# Patient Record
Sex: MALE | Race: ASIAN | NOT HISPANIC OR LATINO | URBAN - METROPOLITAN AREA
[De-identification: names, ages, dates, MRNs, and addresses within clinical notes are randomized per-mention and may not be internally consistent; named-entity substitution may affect disease eponyms.]

---

## 2017-11-24 ENCOUNTER — EMERGENCY (EMERGENCY)
Facility: HOSPITAL | Age: 57
LOS: 1 days | Discharge: HOME | End: 2017-11-24
Attending: EMERGENCY MEDICINE | Admitting: EMERGENCY MEDICINE
Payer: COMMERCIAL

## 2017-11-24 VITALS
TEMPERATURE: 98 F | SYSTOLIC BLOOD PRESSURE: 164 MMHG | OXYGEN SATURATION: 97 % | DIASTOLIC BLOOD PRESSURE: 86 MMHG | HEART RATE: 84 BPM | RESPIRATION RATE: 18 BRPM

## 2017-11-24 VITALS
RESPIRATION RATE: 18 BRPM | SYSTOLIC BLOOD PRESSURE: 154 MMHG | DIASTOLIC BLOOD PRESSURE: 90 MMHG | HEART RATE: 77 BPM | OXYGEN SATURATION: 99 % | TEMPERATURE: 98 F

## 2017-11-24 LAB
ALBUMIN SERPL ELPH-MCNC: 4.2 G/DL — SIGNIFICANT CHANGE UP (ref 3.3–5)
ALP SERPL-CCNC: 94 U/L — SIGNIFICANT CHANGE UP (ref 40–120)
ALT FLD-CCNC: 18 U/L RC — SIGNIFICANT CHANGE UP (ref 10–45)
ANION GAP SERPL CALC-SCNC: 11 MMOL/L — SIGNIFICANT CHANGE UP (ref 5–17)
APTT BLD: 27.3 SEC — LOW (ref 27.5–37.4)
AST SERPL-CCNC: 24 U/L — SIGNIFICANT CHANGE UP (ref 10–40)
BILIRUB SERPL-MCNC: 0.2 MG/DL — SIGNIFICANT CHANGE UP (ref 0.2–1.2)
BUN SERPL-MCNC: 18 MG/DL — SIGNIFICANT CHANGE UP (ref 7–23)
CALCIUM SERPL-MCNC: 10.2 MG/DL — SIGNIFICANT CHANGE UP (ref 8.4–10.5)
CHLORIDE SERPL-SCNC: 103 MMOL/L — SIGNIFICANT CHANGE UP (ref 96–108)
CK SERPL-CCNC: 150 U/L — SIGNIFICANT CHANGE UP (ref 30–200)
CO2 SERPL-SCNC: 25 MMOL/L — SIGNIFICANT CHANGE UP (ref 22–31)
CREAT SERPL-MCNC: 1.46 MG/DL — HIGH (ref 0.5–1.3)
GLUCOSE SERPL-MCNC: 102 MG/DL — HIGH (ref 70–99)
HCT VFR BLD CALC: 47 % — SIGNIFICANT CHANGE UP (ref 39–50)
HGB BLD-MCNC: 15.8 G/DL — SIGNIFICANT CHANGE UP (ref 13–17)
INR BLD: 0.95 RATIO — SIGNIFICANT CHANGE UP (ref 0.88–1.16)
MCHC RBC-ENTMCNC: 30.4 PG — SIGNIFICANT CHANGE UP (ref 27–34)
MCHC RBC-ENTMCNC: 33.7 GM/DL — SIGNIFICANT CHANGE UP (ref 32–36)
MCV RBC AUTO: 90.4 FL — SIGNIFICANT CHANGE UP (ref 80–100)
PLATELET # BLD AUTO: 235 K/UL — SIGNIFICANT CHANGE UP (ref 150–400)
POTASSIUM SERPL-MCNC: 4.5 MMOL/L — SIGNIFICANT CHANGE UP (ref 3.5–5.3)
POTASSIUM SERPL-SCNC: 4.5 MMOL/L — SIGNIFICANT CHANGE UP (ref 3.5–5.3)
PROT SERPL-MCNC: 7.4 G/DL — SIGNIFICANT CHANGE UP (ref 6–8.3)
PROTHROM AB SERPL-ACNC: 10.3 SEC — SIGNIFICANT CHANGE UP (ref 9.8–12.7)
RBC # BLD: 5.2 M/UL — SIGNIFICANT CHANGE UP (ref 4.2–5.8)
RBC # FLD: 11.8 % — SIGNIFICANT CHANGE UP (ref 10.3–14.5)
SODIUM SERPL-SCNC: 139 MMOL/L — SIGNIFICANT CHANGE UP (ref 135–145)
WBC # BLD: 12.6 K/UL — HIGH (ref 3.8–10.5)
WBC # FLD AUTO: 12.6 K/UL — HIGH (ref 3.8–10.5)

## 2017-11-24 PROCEDURE — 82553 CREATINE MB FRACTION: CPT

## 2017-11-24 PROCEDURE — 99283 EMERGENCY DEPT VISIT LOW MDM: CPT | Mod: 25

## 2017-11-24 PROCEDURE — 85610 PROTHROMBIN TIME: CPT

## 2017-11-24 PROCEDURE — 82550 ASSAY OF CK (CPK): CPT

## 2017-11-24 PROCEDURE — 99285 EMERGENCY DEPT VISIT HI MDM: CPT | Mod: 25

## 2017-11-24 PROCEDURE — 84484 ASSAY OF TROPONIN QUANT: CPT

## 2017-11-24 PROCEDURE — 80053 COMPREHEN METABOLIC PANEL: CPT

## 2017-11-24 PROCEDURE — 85730 THROMBOPLASTIN TIME PARTIAL: CPT

## 2017-11-24 PROCEDURE — 85027 COMPLETE CBC AUTOMATED: CPT

## 2017-11-24 PROCEDURE — 93010 ELECTROCARDIOGRAM REPORT: CPT

## 2017-11-24 PROCEDURE — 93005 ELECTROCARDIOGRAM TRACING: CPT

## 2017-11-24 RX ORDER — SODIUM CHLORIDE 9 MG/ML
3 INJECTION INTRAMUSCULAR; INTRAVENOUS; SUBCUTANEOUS EVERY 8 HOURS
Qty: 0 | Refills: 0 | Status: DISCONTINUED | OUTPATIENT
Start: 2017-11-24 | End: 2017-11-28

## 2017-11-24 RX ADMIN — SODIUM CHLORIDE 3 MILLILITER(S): 9 INJECTION INTRAMUSCULAR; INTRAVENOUS; SUBCUTANEOUS at 23:20

## 2017-11-24 RX ADMIN — SODIUM CHLORIDE 3 MILLILITER(S): 9 INJECTION INTRAMUSCULAR; INTRAVENOUS; SUBCUTANEOUS at 23:11

## 2017-11-24 NOTE — ED PROVIDER NOTE - EYES, MLM
Clear bilaterally, pupils equal, round and reactive to light. **ATTENDING ADDENDUM (Dr. Devon Cline): Extraocular muscle movements intact. Clear corneas bilaterally, pupils equal and round. NO nystagmus.

## 2017-11-24 NOTE — ED PROVIDER NOTE - MUSCULOSKELETAL, MLM
Spine appears normal, range of motion is not limited, no muscle or joint tenderness. **ATTENDING ADDENDUM (Dr. Devon Cline): NO cords, soft-tissue swelling, or calf tenderness.

## 2017-11-24 NOTE — ED ADULT NURSE NOTE - OBJECTIVE STATEMENT
57 year old male presented to ED with c/o of dizziness starting today. Pt came from PMD for HTN and abnormal EKG. EKG shows NSR with septal defects. BP currently 164/86. Pt denies CP, SOB, nausea/vomiting, numbness/tingling, fever, cough, chills, headache, blurred vision, slurred speech. Pt a&ox3, lung sounds clear, heart rate regular, abdomen soft nontender nondistended to palp. Skin intact. IV in left AC 20G and patent. Pt currently resting in bed with family at bedside. Will continue to monitor and assess while offering support and reassurance.

## 2017-11-24 NOTE — ED PROVIDER NOTE - CRANIAL NERVE AND PUPILLARY EXAM
cranial nerves 2-12 intact/central and peripheral vision intact/gag reflex intact/tongue is midline/extra-ocular movements intact

## 2017-11-24 NOTE — ED PROVIDER NOTE - GASTROINTESTINAL, MLM
Abdomen soft, non-tender **ATTENDING ADDENDUM (Dr. Devon Cline): NO guarding, rebound, or rigidity. NO pulsatile or non-pulsatile masses. NO hernias. NO obvious hepatosplenomegaly.

## 2017-11-24 NOTE — ED PROVIDER NOTE - CARE PLAN
Principal Discharge DX:	Dizziness  Goal:	ATTENDING ADDENDUM (Dr. Devon Cline): Goals of care include resolution of emergent/urgent symptoms and concerns, and restoration to baseline level of homeostasis.  Instructions for follow-up, activity and diet:	ATTENDING ADDENDUM (Dr. Devon Cline): (1) anticipatory guidance provided  (2) rest  (3) outpatient follow-up with your primary care physician/provider (4) return if symptoms worsen, persist, or do not resolve (5) medications, if indicated, as prescribed (6) call you doctor for referral to cardiologist.  Secondary Diagnosis:	HLD (hyperlipidemia)  Secondary Diagnosis:	HTN (hypertension)

## 2017-11-24 NOTE — ED PROVIDER NOTE - NS_ ATTENDINGSCRIBEDETAILS _ED_A_ED_FT
**ATTENDING ADDENDUM (Dr. Devon Cline): I attest that I have directly examined this patient and reviewed and formulated the diagnostic and therapeutic management as described in EMR, including diagnostics, therapeutics and consultation as clinically warranted as noted and documented by my scribe. Please see MDM note and remainder of EMR for findings from CC, HPI, ROS, and PE. (Qu)

## 2017-11-24 NOTE — ED PROVIDER NOTE - PLAN OF CARE
ATTENDING ADDENDUM (Dr. Devon Cline): Goals of care include resolution of emergent/urgent symptoms and concerns, and restoration to baseline level of homeostasis. ATTENDING ADDENDUM (Dr. Devon Cline): (1) anticipatory guidance provided  (2) rest  (3) outpatient follow-up with your primary care physician/provider (4) return if symptoms worsen, persist, or do not resolve (5) medications, if indicated, as prescribed (6) call you doctor for referral to cardiologist.

## 2017-11-24 NOTE — ED PROVIDER NOTE - RESPIRATORY, MLM
Breath sounds clear and equal bilaterally. **ATTENDING ADDENDUM (Dr. Devon Cline): NO wheezing, rales, rhonchi, crackles, stridor, drooling, retractions, nasal flaring, or tripoding.

## 2017-11-24 NOTE — ED ADULT NURSE NOTE - CHPI ED SYMPTOMS NEG
no chills/no nausea/no numbness/no decreased eating/drinking/no pain/no tingling/no weakness/no fever/no vomiting

## 2017-11-24 NOTE — ED PROVIDER NOTE - CONDUCTED A DETAILED DISCUSSION WITH PATIENT AND/OR GUARDIAN REGARDING, MDM
lab results/return to ED if symptoms worsen, persist or questions arise/**ATTENDING ADDENDUM (Dr. Devon Cline): Anticipatory guidance provided./need for outpatient follow-up

## 2017-11-24 NOTE — ED PROVIDER NOTE - PHYSICAL EXAMINATION
**ATTENDING ADDENDUM (Dr. Devon Cline): I have reviewed and substantially contributed to the elements of the PE as documented above. I have directly performed an examination of this patient in conjunction with the other members (EM resident/PA/NP) of the patient care team.

## 2017-11-24 NOTE — ED PROVIDER NOTE - PROGRESS NOTE DETAILS
**ATTENDING ADDENDUM (Dr. Devon Cline): patient serially evaluated throughout ED course. NO acute deterioration up to this time in the ED. Initial set of ED diagnostics reviewed and noted with family. Anticipatory guidance provided. Agree with goals/plan of ED care as noted in the EMR. Will continue to observe and monitor closely until second set of cardiac markers drawn (approximately 03:00). **ATTENDING ADDENDUM (Dr. Devon Cline): patient serially evaluated throughout ED course. NO acute deterioration up to this time in the ED. Patient's son reports that patient ... **ATTENDING ADDENDUM (Dr. Devon Cline): patient serially evaluated throughout ED course. NO acute deterioration up to this time in the ED. Patient's son reports that patient does not want to stay in the ED for serial troponins and ECGs. Anticipatory guidance provided.. Risks, benefits and alternatives to the initially-proposed goals/plan of ED care as described in EMR, reviewed with patient and with family. Verbalized understanding of these. Patient prefers close outpatient followup with primary care physician/provider in lieu of delta troponins. Anticipatory guidance provided.

## 2017-11-24 NOTE — ED PROVIDER NOTE - ENMT, MLM
Airway patent, Nasal mucosa clear. Mouth with normal mucosa. Throat has no vesicles, no oropharyngeal exudates and uvula is midline. **ATTENDING ADDENDUM (Dr. Devon Cline): NO droop.

## 2017-11-24 NOTE — ED PROVIDER NOTE - PRIOR EKG STATUS
there is no prior EKG available for comparison/**ATTENDING ADDENDUM (Dr. Devon Cline): ECG from urgent care center

## 2017-11-24 NOTE — ED PROVIDER NOTE - OBJECTIVE STATEMENT
**ATTENDING OBJECTIVE STATEMENT (Dr. Devon Cline): I have reviewed this note, the presenting symptoms, and the Chief Complaint and the History of Present Illness as documented, with the other care provider(s) and nurses on the patient care team. I have also reviewed this patient's past medical/surgical history and social/family history as reviewed and listed in this electronic medical record. Patient is a 57-year-old man with the with PMHx of HTN, HLD, c/o dizziness and nausea. Described as everything was spinning. Currently pt doesn't endorse dizziness. Also notes lower back pain. Pt went to urgent care and noted EKG changes. Was then referred to the ED. Pt had a stress test 4 years ago and a CT scan 4 months ago with normal results by his cardiologist. Also notes chronic indigestion and noted a few days ago. Denies recent travel or any other complaints. **ATTENDING OBJECTIVE STATEMENT (Dr. Devon Cline): I have reviewed this note, the presenting symptoms, and the Chief Complaint and the History of Present Illness as documented, with the other care provider(s) and nurses on the patient care team. I have also reviewed this patient's past medical/surgical history and social/family history as reviewed and listed in this electronic medical record. Patient is a 57-year-old man with a history of Hypertension and hyperlipidemia now presenting with dizziness and nausea. Described everything as "spinning" earlier today, movement-associated, and reproducible with head rotation. Currently resolved. Also notes lower back pain. Went to urgent care center where providers there noted "ECG changes" ("septal infarct"). Referred to ED for advanced diagnostics. Reports stress testing four years ago ("negative") and a Cardiac CT scan 4 months ago ("normal results") by his cardiologist. Also notes chronic indigestion over the past few days. Denies recent travel or any other complaints. NO fevers or chills. NO syncope or near-syncope. NO chest pain, palpitations, shortness of breath, dyspnea on exertion, abdominal pain, frequency, urgency, hesitancy, dysuria, or flank/back pain. NO headaches, visual changes or speech changes. NO new medications prior to arrival. Here for evaluation. VAS 1/10. Accompanied by family.

## 2017-11-25 LAB
CK MB BLD-MCNC: 1.7 % — SIGNIFICANT CHANGE UP (ref 0–3.5)
CK MB CFR SERPL CALC: 2.6 NG/ML — SIGNIFICANT CHANGE UP (ref 0–6.7)
TROPONIN T SERPL-MCNC: <0.01 NG/ML — SIGNIFICANT CHANGE UP (ref 0–0.06)

## 2018-04-19 NOTE — ED PROVIDER NOTE - NS_EDPROVIDERDISPOUSERTYPE_ED_A_ED
Scribe Attestation (For Scribes USE Only)... No Attending Attestation (For Attendings USE Only).../Scribe Attestation (For Scribes USE Only)...

## 2020-01-03 NOTE — ED PROVIDER NOTE - GASTROINTESTINAL [+], MLM
"Oncology Rooming Note    January 3, 2020 9:50 AM   Angel Yanez is a 60 year old male who presents for:    Chief Complaint   Patient presents with     Lab Only     venipuncture, vitals checked     Infusion     here for transfusion of platelets HX: MM     Initial Vitals: /80   Pulse 83   Temp 98.7  F (37.1  C)   Resp 14   Wt 73.3 kg (161 lb 9.6 oz)   SpO2 100%   BMI 23.19 kg/m   Estimated body mass index is 23.19 kg/m  as calculated from the following:    Height as of 12/16/19: 1.778 m (5' 10\").    Weight as of this encounter: 73.3 kg (161 lb 9.6 oz). Body surface area is 1.9 meters squared.  No Pain (0) Comment: Data Unavailable   No LMP for male patient.  Allergies reviewed: Yes  Medications reviewed: Yes    Medications: Medication refills not needed today.  Pharmacy name entered into EPIC:    [facility] - [place], [place] - [address] [facility] - [place], [place] - [address] [facility]    Clinical concerns: Pt denies current complaints at this visit.  New PIV inserted in lab.  Will transfuse 1 unit of platelets this visit for count of 11K.  Pt denies s/s of bleeding.  ANC 1400 today.  No provider visit scheduled today.        Emelina Bose RN              "
NAUSEA

## 2022-06-14 NOTE — ED ADULT NURSE NOTE - MUSCULOSKELETAL ASSESSMENT
[facility] Aklief counseling:  Patient advised to apply a pea-sized amount only at bedtime and wait 30 minutes after washing their face before applying.  If too drying, patient may add a non-comedogenic moisturizer.  The most commonly reported side effects including irritation, redness, scaling, dryness, stinging, burning, itching, and increased risk of sunburn.  The patient verbalized understanding of the proper use and possible adverse effects of retinoids.  All of the patient's questions and concerns were addressed.

## 2022-09-19 NOTE — ED PROVIDER NOTE - DISCHARGE REVIEW MATERIAL PRESENTED
ICD-10-CM ICD-9-CM    1. Prediabetes  R73.03 790.29       2. Obesity (BMI 30-39. 9)  E66.9 278.00       3. Encounter for weight loss counseling  Z71.3 V65.3       4. Hypercholesterolemia  E78.00 272.0       5. Encounter for immunization  Z23 V03.89 ZOSTER, SHINGRIX, (18 YRS +), IM               Subjective:     Chief Complaint   Patient presents with    Follow-up       Laurel Hooker is a 61 y.o. M. He has a past medical history of hepatic steatosis as well as diverticulosis, among other medical problems. I reviewed and updated the medical record. I saw this patient most recently in early July for establishment and routine follow-up of his medical concerns. He had reported feeling overall fine at the time and was without significant complaints. He was noted to be up-to-date on screening colonoscopy. Physical examination at the time had been notable for obesity with a BMI of 32. He was referred for routine laboratory testing. Weight loss has been advised. Today, the patient comes in mainly for discussion regarding weight loss. He reports feeling fine overall today and has no significant acute somatic complaints. Since his last visit, there have been no overall clinical significant changes. He continues to work out with a  about 2 times a week, but reports that he has been relatively at the same weight over the past several months. He has been watching his diet carefully, although he is not on a formal dietary plan at this point other than going to see his  twice weekly. He denies having had any chest pain, shortness breath, or any further cardiopulmonary symptoms. His main concern today is to discuss the potential for weight loss medications. The entirety of today's 15-minute visit is spent discussing the potential for weight loss medications including GLP-1 agonist such as Rin Valentín and [facility].   Discussed the mechanism of action of these medications, as well as the potential side effects, contraindications, and the need to obtain nutrition referral or dietary plan for at least 6 months prior to initiation, as well as the need to demonstrate comorbidities. The patient is noted to have a history of fatty liver disease as observed with hepatic steatosis with a CT scan a couple years ago. He denies having had any change in the color of his stool, or any other constitutional systemic complaints. His review of systems is otherwise negative. Routine Healthcare Maintenance issues are reviewed and discussed with the patient as noted below. Orders to update gaps in healthcare maintenance were placed as noted below in the Assessment and Plan, where applicable. 10-year Cardiovascular Risk Marcie Bueno, 2013):   The 10-year ASCVD risk score (Ho LEMON, et al., 2019) is: 5%    Values used to calculate the score:      Age: 61 years      Sex: Male      Is Non- : No      Diabetic: No      Tobacco smoker: No      Systolic Blood Pressure: 633 mmHg      Is BP treated: No      HDL Cholesterol: 50 mg/dL      Total Cholesterol: 177 mg/dL       Past Medical History:  Past Medical History:   Diagnosis Date    Diverticulosis     Hepatic steatosis 05/2021    CT Finding    History of colon polyps     History of diverticulitis     Horseshoe kidney 05/20/2019    Hypercholesterolemia 07/2022    Obesity (BMI 30-39.9) 05/20/2019    Prediabetes 07/2022 7/22: A1C = 5.7%, FBG = 104 mg/dL       Past Surgical Histor:  Past Surgical History:   Procedure Laterality Date    HX UROLOGICAL      hypospadious surgery       Allergies:  No Known Allergies    Medications:      Social History:  Social History     Socioeconomic History    Marital status:     Number of children: 3   Tobacco Use    Smoking status: Never    Smokeless tobacco: Never   Vaping Use    Vaping Use: Never used   Substance and Sexual Activity    Alcohol use: Yes     Comment: WEEKENDS    Drug use: Never       Family History:  Family History   Problem Relation Age of Onset    Breast Cancer Mother     Ovarian Cancer Mother        Immunizations:  Immunization History   Administered Date(s) Administered    Tdap 07/05/2022    Zoster Recombinant 07/05/2022        Healthcare Maintenance:  Health Maintenance   Topic Date Due    COVID-19 Vaccine (1) Never done    Flu Vaccine (1) Never done    Shingrix Vaccine Age 50> (2 of 2) 08/30/2022    Medicare Yearly Exam  08/29/2022    Depression Screen  07/01/2023    A1C test (Diabetic or Prediabetic)  07/01/2023    Colorectal Cancer Screening Combo  10/01/2024    Lipid Screen  07/01/2027    DTaP/Tdap/Td series (2 - Td or Tdap) 07/05/2032    Hepatitis C Screening  Completed    Pneumococcal 0-64 years  Aged Out        Review of Systems:  ROS:  Review of Systems   Constitutional: Negative. HENT: Negative. Eyes: Negative. Respiratory: Negative. Cardiovascular: Negative. Gastrointestinal: Negative. Genitourinary: Negative. Musculoskeletal: Negative. Skin: Negative. Neurological: Negative. Endo/Heme/Allergies: Negative. Psychiatric/Behavioral: Negative. ROS otherwise negative      Objective:     Vital Signs:  Visit Vitals  /76 (BP 1 Location: Left upper arm, BP Patient Position: Sitting, BP Cuff Size: Adult)   Pulse 70   Temp 98.4 °F (36.9 °C) (Oral)   Resp 16   Ht 5' 5\" (1.651 m)   Wt 191 lb 8 oz (86.9 kg)   SpO2 97%   BMI 31.87 kg/m²       BMI:  Body mass index is 31.87 kg/m². Physical Examination:  Physical Exam  Constitutional:       Appearance: Normal appearance. He is obese. HENT:      Head: Normocephalic and atraumatic. Pulmonary:      Effort: Pulmonary effort is normal.   Skin:     General: Skin is warm and dry. Coloration: Skin is not jaundiced. Neurological:      Mental Status: He is alert.         Physical exam otherwise negative    Diagnostic Testing:    Laboratory Studies:  Office Visit on 07/01/2022   Component Date Value Ref Range Status Cholesterol, total 07/01/2022 177  100 - 199 mg/dL Final    Triglyceride 07/01/2022 119  0 - 149 mg/dL Final    HDL Cholesterol 07/01/2022 50  >39 mg/dL Final    VLDL, calculated 07/01/2022 21  5 - 40 mg/dL Final    LDL, calculated 07/01/2022 106 (A)  0 - 99 mg/dL Final    Hemoglobin A1c 07/01/2022 5.7 (A)  4.8 - 5.6 % Final    Comment:          Prediabetes: 5.7 - 6.4           Diabetes: >6.4           Glycemic control for adults with diabetes: <7.0      Estimated average glucose 07/01/2022 117  mg/dL Final    Glucose 07/01/2022 104 (A)  65 - 99 mg/dL Final    BUN 07/01/2022 14  6 - 24 mg/dL Final    Creatinine 07/01/2022 1.16  0.76 - 1.27 mg/dL Final    eGFR 07/01/2022 73  >59 mL/min/1.73 Final    BUN/Creatinine ratio 07/01/2022 12  9 - 20 Final    Sodium 07/01/2022 142  134 - 144 mmol/L Final    Potassium 07/01/2022 4.3  3.5 - 5.2 mmol/L Final    Chloride 07/01/2022 102  96 - 106 mmol/L Final    CO2 07/01/2022 25  20 - 29 mmol/L Final    Calcium 07/01/2022 9.4  8.7 - 10.2 mg/dL Final    Protein, total 07/01/2022 6.9  6.0 - 8.5 g/dL Final    Albumin 07/01/2022 4.6  3.8 - 4.9 g/dL Final    GLOBULIN, TOTAL 07/01/2022 2.3  1.5 - 4.5 g/dL Final    A-G Ratio 07/01/2022 2.0  1.2 - 2.2 Final    Bilirubin, total 07/01/2022 1.0  0.0 - 1.2 mg/dL Final    Alk.  phosphatase 07/01/2022 99  44 - 121 IU/L Final    AST (SGOT) 07/01/2022 25  0 - 40 IU/L Final    ALT (SGPT) 07/01/2022 26  0 - 44 IU/L Final   Office Visit on 04/29/2022   Component Date Value Ref Range Status    WBC 04/29/2022 6.6  3.4 - 10.8 x10E3/uL Final    RBC 04/29/2022 4.54  4.14 - 5.80 x10E6/uL Final    HGB 04/29/2022 14.9  13.0 - 17.7 g/dL Final    HCT 04/29/2022 44.4  37.5 - 51.0 % Final    MCV 04/29/2022 98 (A)  79 - 97 fL Final    MCH 04/29/2022 32.8  26.6 - 33.0 pg Final    MCHC 04/29/2022 33.6  31.5 - 35.7 g/dL Final    RDW 04/29/2022 12.2  11.6 - 15.4 % Final    PLATELET 69/32/9274 625  150 - 450 x10E3/uL Final    NEUTROPHILS 04/29/2022 50  Not Estab. % Final    Lymphocytes 04/29/2022 33  Not Estab. % Final    MONOCYTES 04/29/2022 13  Not Estab. % Final    EOSINOPHILS 04/29/2022 3  Not Estab. % Final    BASOPHILS 04/29/2022 1  Not Estab. % Final    ABS. NEUTROPHILS 04/29/2022 3.3  1.4 - 7.0 x10E3/uL Final    Abs Lymphocytes 04/29/2022 2.1  0.7 - 3.1 x10E3/uL Final    ABS. MONOCYTES 04/29/2022 0.9  0.1 - 0.9 x10E3/uL Final    ABS. EOSINOPHILS 04/29/2022 0.2  0.0 - 0.4 x10E3/uL Final    ABS. BASOPHILS 04/29/2022 0.0  0.0 - 0.2 x10E3/uL Final    IMMATURE GRANULOCYTES 04/29/2022 0  Not Estab. % Final    ABS. IMM. GRANS. 04/29/2022 0.0  0.0 - 0.1 x10E3/uL Final    Specific Gravity 04/29/2022 1.008  1.005 - 1.030 Final    pH (UA) 04/29/2022 7.5  5.0 - 7.5 Final    Color 04/29/2022 Yellow  Yellow Final    Appearance 04/29/2022 Clear  Clear Final    Leukocyte Esterase 04/29/2022 Negative  Negative Final    Protein 04/29/2022 Negative  Negative/Trace Final    Glucose 04/29/2022 Negative  Negative Final    Ketone 04/29/2022 Negative  Negative Final    Blood 04/29/2022 Negative  Negative Final    Bilirubin 04/29/2022 Negative  Negative Final    Urobilinogen 04/29/2022 0.2  0.2 - 1.0 mg/dL Final    Nitrites 04/29/2022 Negative  Negative Final    Microscopic Examination 04/29/2022 Comment   Final    Microscopic follows if indicated. Microscopic exam 04/29/2022 See additional order   Final    Microscopic was indicated and was performed. URINALYSIS REFLEX 04/29/2022 Comment   Final    This specimen will not reflex to a Urine Culture. WBC 04/29/2022 None seen  0 - 5 /hpf Final    RBC 04/29/2022 0-2  0 - 2 /hpf Final    Epithelial cells 04/29/2022 0-10  0 - 10 /hpf Final    Casts 04/29/2022 None seen  None seen /lpf Final    Bacteria 04/29/2022 None seen  None seen/Few Final         Radiographic Studies:  XR Results (most recent):  No results found for this or any previous visit. CADE Results (most recent):  No results found for this or any previous visit.      CT Results (most recent):  Results from [place] encounter on 05/28/21    CT ABD PELV W CONT    Narrative  CLINICAL HISTORY: Left lower quadrant pain  INDICATION: Left lower quadrant pain  COMPARISON: None. CONTRAST: 100  ml Isovue 370  TECHNIQUE:  Multislice helical CT was performed of the abdomen and pelvis following  uneventful rapid bolus intravenous contrast administration. Oral contrast was  not administered. Contiguous 5 mm axial images were reconstructed and lung and  soft tissue windows were generated. Coronal and sagittal reformations were  generated. CT dose reduction was achieved through use of a standardized  protocol tailored for this examination and automatic exposure control for dose  modulation. FINDINGS:  LUNG BASES:No mass lesion or effusion. LIVER: Hepatic steatosis There is no intrahepatic duct dilatation. There is no  hepatic parenchymal mass. Hepatic enhancement pattern is within normal limits. Portal vein is patent. GALLBLADDER:  No dilatation or wall thickening. SPLEEN/PANCREAS: No mass. There is no pancreatic duct dilatation. There is no  evidence of splenomegaly. ADRENALS/KIDNEYS: Horseshoe kidneys  There is no hydronephrosis. There is no  renal mass. There is no perinephric mass. Punctate nonobstructive calculus  STOMACH: No dilatation or wall thickening. COLON AND SMALL BOWEL: There is mural thickening in colon at the sigmoid colon  with hyperattenuating diverticulum and abundant pericolonic fat stranding. There  is moderate thickening of the left lateral conal fascia. No extraluminal gas is  demonstrated in the sigmoid mesentery. No associated abscess or drainable fluid  collection. There is no free intraperitoneal air. There is no evidence of  incarceration or obstruction. No mesenteric adenopathy. PERITONEUM: Unremarkable. APPENDIX: Unremarkable. BLADDER/REPRODUCTIVE ORGANS: No mass or calculus. RETROPERITONEUM: Unremarkable.  The abdominal aorta is normal in caliber. No  aneurysm. No retroperitoneal adenopathy. OSSEOUS STRUCTURES: No destructive bone lesion. Impression  Imaging findings consistent with mild sigmoid colonic diverticulitis. No abscess  or drainable collection is present. Incidentally noted horseshoe kidneys. DEXA Results (most recent):  No results found for this or any previous visit. MRI Results (most recent):  No results found for this or any previous visit. Assessment/Plan:       ICD-10-CM ICD-9-CM    1. Prediabetes  R73.03 790.29       2. Obesity (BMI 30-39. 9)  E66.9 278.00       3. Encounter for weight loss counseling  Z71.3 V65.3       4. Hypercholesterolemia  E78.00 272.0       5. Encounter for immunization  Z23 V03.89 ZOSTER, SHINGRIX, (18 YRS +), IM             Obesity:   - Body mass index is 31.87 kg/m². - Current Obesity Medications (if any):   Key Obesity Meds       Patient is on no anti-obesity meds. - Discussed therapeutic lifestyle changes: dietary modifications, caloric restriction, increased aerobic exercise, resistance training   - Patient adherent to dietary modifications x 6 months (e.g., Mediterranean Diet): NO   - Previous Nutrition Referral or formal weight loss plan x 6 m: NO   - Comorbidities:   - Obstructive sleep apnea: NO   - Obesity-related hypoventilation:NO    - Diabetes Mellitus:NO   - Hypertension:NO   - Other (GERD, OA, back pain):YES   - Qualifies for medication or bariatric surgery referral? NO   - Notes: Patient does have history of nonalcoholic fatty liver disease, and if he is not successful at losing weight despite 6 months of dietary modifications and other therapy lifestyle changes, then I would certainly consider the possibility of a GLP-1 agonist such as Orly and Fanta Mcclendon. Information regarding the medications is provided to the patient today in the after visit summary.   For now, as he has been undergoing physical training now for the past 2 months or so, we discussed potentially initiation of these medications at the 6-month raz, if he is unsuccessful with losing weight while on to the strict dietary and lifestyle change plan. Diabetes Mellitus:   - Type: prediabetes   - Current A1C:   Lab Results   Component Value Date/Time    Hemoglobin A1c 5.7 (H) 07/01/2022 09:12 AM       - Target M6O: <5.2%   - Complications: none   - Relevant Diabetes Medications:  Key Antihyperglycemic Medications       Patient is on no antihyperglycemic meds. No indication for medications at this time; repeat A1c in July. Advised weight loss as noted above. Hyperlipidemia/Dyslipidemia:   - Summary of Cardiovascular Risks and Goals:     LDL goal is under 130   -   Lab Results   Component Value Date/Time    LDL, calculated 106 (H) 07/01/2022 09:12 AM    LDL, calculated 113 (H) 06/29/2020 08:48 AM    HDL Cholesterol 50 07/01/2022 09:12 AM       - Relevant Cholesterol Meds:  Key Antihyperlipidemia Meds       The patient is on no antihyperlipidemia meds. - Cholesterol at target: yes   - Does patient meet USPSTF and ACC/AHA indications for pharmacotherapy (e.g., statin): no     - Notes: Discussed weight loss as noted above    Fatty Liver Disease:   - Biopsy (date): n/a ( CT Finding only)   - Symptoms (if any): none   - Medications (if any):     - FIB4 Score: 1.21   - Weight Loss indicated? YES   - Last Fibroscan (repeat annually): Consider if no weight loss in 1 year   LFTs: reviewed as above in labs   - Follows with GI: NO   - Vaccinations:   HAV: There is no immunization history for the selected administration types on file for this patient. HBV: There is no immunization history for the selected administration types on file for this patient. There is currently no FDA approved medical treatment for fatty liver, NALFD or BERNARD. The only medical treatments for BERNARD are though clinical trials.         I have reviewed the patient's medical history in detail and updated the computerized patient record. We had a prolonged discussion about these complex clinical issues and went over the various important aspects to consider. All questions were answered. Advised the patient to call back or return to office if symptoms do not improve, change in nature, or persist.     The patient was given an after visit summary or informed of [facility] which includes patient instructions, diagnoses, current medications, & vitals. he expressed understanding with the diagnosis and plan. Carina Severino MD    Please note that this dictation was completed with [employer], the computer voice recognition software. Quite often unanticipated grammatical, syntax, homophones, and other interpretive errors are inadvertently transcribed by the computer software. Please disregard these errors. Please excuse any errors that have escaped final proofreading. .